# Patient Record
Sex: FEMALE | Race: WHITE | Employment: FULL TIME | URBAN - METROPOLITAN AREA
[De-identification: names, ages, dates, MRNs, and addresses within clinical notes are randomized per-mention and may not be internally consistent; named-entity substitution may affect disease eponyms.]

---

## 2019-05-14 ENCOUNTER — HOSPITAL ENCOUNTER (EMERGENCY)
Age: 29
Discharge: HOME | End: 2019-05-14
Attending: EMERGENCY MEDICINE
Payer: SELF-PAY

## 2019-05-14 VITALS
BODY MASS INDEX: 36.48 KG/M2 | HEIGHT: 71 IN | HEART RATE: 91 BPM | DIASTOLIC BLOOD PRESSURE: 87 MMHG | WEIGHT: 260.58 LBS | RESPIRATION RATE: 12 BRPM | TEMPERATURE: 98.6 F | SYSTOLIC BLOOD PRESSURE: 137 MMHG | OXYGEN SATURATION: 97 %

## 2019-05-14 DIAGNOSIS — H60.501 ACUTE OTITIS EXTERNA OF RIGHT EAR, UNSPECIFIED TYPE: Primary | ICD-10-CM

## 2019-05-14 PROCEDURE — 99283 EMERGENCY DEPT VISIT LOW MDM: CPT

## 2019-05-14 RX ORDER — BISMUTH SUBSALICYLATE 262 MG
1 TABLET,CHEWABLE ORAL DAILY
COMMUNITY

## 2019-05-14 RX ORDER — NEOMYCIN SULFATE, POLYMYXIN B SULFATE, HYDROCORTISONE 3.5; 10000; 1 MG/ML; [USP'U]/ML; MG/ML
3-4 SOLUTION/ DROPS AURICULAR (OTIC) 4 TIMES DAILY
Qty: 1 BOTTLE | Refills: 0 | Status: SHIPPED | OUTPATIENT
Start: 2019-05-14 | End: 2019-05-17

## 2019-05-14 NOTE — ED TRIAGE NOTES
Pt arrives ambulatory to ed with complaints of right ear pain with green drainage since yesterday. Drainage started this am. Pt states she took ASA around 1300.

## 2019-05-15 NOTE — ED PROVIDER NOTES
23-year-old female with no significant past medical history presents with complaints of right ear pain starting yesterday and drainage starting this morning. Patient reports green drainage. Denies trauma. Denies fever, chills, nausea, vomiting. On questioning reports soaking in bathtub and submerging head, no recent swimming. Mildly diminished hearing on the right side. No other complaints. Regular tobacco use Occasional alcohol use Denies drug use No primary care physician History reviewed. No pertinent past medical history. Past Surgical History:  
Procedure Laterality Date  HX ORTHOPAEDIC History reviewed. No pertinent family history. Social History Socioeconomic History  Marital status: Not on file Spouse name: Not on file  Number of children: Not on file  Years of education: Not on file  Highest education level: Not on file Occupational History  Not on file Social Needs  Financial resource strain: Not on file  Food insecurity:  
  Worry: Not on file Inability: Not on file  Transportation needs:  
  Medical: Not on file Non-medical: Not on file Tobacco Use  Smoking status: Current Every Day Smoker Packs/day: 0.50  Smokeless tobacco: Never Used Substance and Sexual Activity  Alcohol use: Yes Comment: rarely  Drug use: Never  Sexual activity: Not on file Lifestyle  Physical activity:  
  Days per week: Not on file Minutes per session: Not on file  Stress: Not on file Relationships  Social connections:  
  Talks on phone: Not on file Gets together: Not on file Attends Shinto service: Not on file Active member of club or organization: Not on file Attends meetings of clubs or organizations: Not on file Relationship status: Not on file  Intimate partner violence:  
  Fear of current or ex partner: Not on file Emotionally abused: Not on file Physically abused: Not on file Forced sexual activity: Not on file Other Topics Concern  Not on file Social History Narrative  Not on file ALLERGIES: Patient has no known allergies. Review of Systems Constitutional: Negative for chills and fever. HENT: Positive for ear discharge and ear pain. Negative for congestion and facial swelling. Eyes: Negative for pain and redness. Respiratory: Negative for cough and shortness of breath. Cardiovascular: Negative for chest pain. Gastrointestinal: Negative for abdominal pain and vomiting. Genitourinary: Negative for decreased urine volume and dysuria. Vitals:  
 05/14/19 1959 BP: 165/78 Pulse: 91  
Resp: 12 Temp: 98.6 °F (37 °C) SpO2: 97% Weight: 118.2 kg (260 lb 9.3 oz) Height: 5' 11\" (1.803 m) Physical Exam  
Constitutional: She is oriented to person, place, and time. She appears well-developed and well-nourished. HENT:  
Head: Normocephalic and atraumatic. Right Ear: There is drainage, swelling and tenderness. No foreign bodies. No mastoid tenderness. No middle ear effusion. No hemotympanum. Decreased hearing is noted. Left Ear: Tympanic membrane normal. No drainage, swelling or tenderness. No foreign bodies. No mastoid tenderness. No middle ear effusion. No hemotympanum. No decreased hearing is noted. Eyes: Pupils are equal, round, and reactive to light. EOM are normal.  
Neck: Normal range of motion. Neck supple. No tracheal deviation present. Pulmonary/Chest: Effort normal and breath sounds normal.  
Musculoskeletal: Normal range of motion. She exhibits no edema or tenderness. Neurological: She is alert and oriented to person, place, and time. No cranial nerve deficit. Skin: Skin is warm and dry. No rash noted. No pallor. Psychiatric: She has a normal mood and affect. Nursing note and vitals reviewed. MDM Number of Diagnoses or Management Options Acute otitis externa of right ear, unspecified type:  
Diagnosis management comments: Presenting year-old female with no past nuchal history presents with right ear pain x2 days and drainage x1 day. Patient is well-appearing, in no acute distress, hemodynamically stable, afebrile, nontoxic, right external ear canal with swelling and drainage noted, tragus  tenderness to palpation. Left TM clear, external canal clear, no tenderness to palpation. Consistent with otitis externa. Procedures

## 2019-05-15 NOTE — DISCHARGE INSTRUCTIONS
Patient Education        Swimmer's Ear: Care Instructions  Your Care Instructions    Swimmer's ear (otitis externa) is inflammation or infection of the ear canal. This is the passage that leads from the outer ear to the eardrum. Any water, sand, or other debris that gets into the ear canal and stays there can cause swimmer's ear. Putting cotton swabs or other items in the ear to clean it can also cause this problem. Swimmer's ear can be very painful. But you can treat the pain and infection with medicines. You should feel better in a few days. Follow-up care is a key part of your treatment and safety. Be sure to make and go to all appointments, and call your doctor if you are having problems. It's also a good idea to know your test results and keep a list of the medicines you take. How can you care for yourself at home? Cleaning and care  · Use antibiotic drops as your doctor directs. · Do not insert ear drops (other than the antibiotic ear drops) or anything else into the ear unless your doctor has told you to. · Avoid getting water in the ear until the problem clears up. Use cotton lightly coated with petroleum jelly as an earplug. Do not use plastic earplugs. · Use a hair dryer set on low to carefully dry the ear after you shower. · To ease ear pain, hold a warm washcloth against your ear. · Take pain medicines exactly as directed. ? If the doctor gave you a prescription medicine for pain, take it as prescribed. ? If you are not taking a prescription pain medicine, ask your doctor if you can take an over-the-counter medicine. Inserting ear drops  · Warm the drops to body temperature by rolling the container in your hands. Or you can place it in a cup of warm water for a few minutes. · Lie down, with your ear facing up. · Place drops inside the ear. Follow your doctor's instructions (or the directions on the label) for how many drops to use.  Gently wiggle the outer ear or pull the ear up and back to help the drops get into the ear. · It's important to keep the liquid in the ear canal for 3 to 5 minutes. When should you call for help? Call your doctor now or seek immediate medical care if:    · You have a new or higher fever.     · You have new or worse pain, swelling, warmth, or redness around or behind your ear.     · You have new or increasing pus or blood draining from your ear.    Watch closely for changes in your health, and be sure to contact your doctor if:    · You are not getting better after 2 days (48 hours). Where can you learn more? Go to http://vivian-vicky.info/. Enter C706 in the search box to learn more about \"Swimmer's Ear: Care Instructions. \"  Current as of: March 27, 2018  Content Version: 11.9  © 6318-4282 [employer]. Care instructions adapted under license by [employer] (which disclaims liability or warranty for this information). If you have questions about a medical condition or this instruction, always ask your healthcare professional. [address] any warranty or liability for your use of this information.

## 2019-05-17 ENCOUNTER — HOSPITAL ENCOUNTER (EMERGENCY)
Age: 29
Discharge: HOME | End: 2019-05-17
Attending: STUDENT IN AN ORGANIZED HEALTH CARE EDUCATION/TRAINING PROGRAM
Payer: SELF-PAY

## 2019-05-17 VITALS
HEART RATE: 67 BPM | DIASTOLIC BLOOD PRESSURE: 78 MMHG | WEIGHT: 254.63 LBS | TEMPERATURE: 97.5 F | RESPIRATION RATE: 14 BRPM | SYSTOLIC BLOOD PRESSURE: 152 MMHG | BODY MASS INDEX: 35.51 KG/M2 | OXYGEN SATURATION: 100 %

## 2019-05-17 DIAGNOSIS — H66.93 ACUTE EAR INFECTION, BILATERAL: Primary | ICD-10-CM

## 2019-05-17 PROCEDURE — 99282 EMERGENCY DEPT VISIT SF MDM: CPT

## 2019-05-17 RX ORDER — AMOXICILLIN AND CLAVULANATE POTASSIUM 875; 125 MG/1; MG/1
1 TABLET, FILM COATED ORAL 2 TIMES DAILY
Qty: 20 TAB | Refills: 0 | Status: SHIPPED | OUTPATIENT
Start: 2019-05-17 | End: 2019-05-27

## 2019-05-17 NOTE — ED TRIAGE NOTES
Triage note: Pt reports she was seen here a few days ago and dx with otitis externa of the right ear. Was placed on otic drops. States now she feels like the left ear is infected. Reports drainage from both ears.

## 2019-05-17 NOTE — ED NOTES
I have reviewed discharge instructions with the patient. The patient verbalized understanding. No change in pt. Initial assessment.

## 2019-05-17 NOTE — ED PROVIDER NOTES
32 yo F with no sig PMH presents with worsening ear pain. Was seen here 3 days ago for swimmer's ear, taking ear drops as prescribed, but gradually worsening pain and drainage from R ear, and now pain and drainage from L ear as well. Decreased hearing in both ears. Chewing make pain worse. Nothing makes pain better. No fever, headache, neck pain, or rash. The history is provided by the patient. Ear Pain This is a new problem. The current episode started more than 2 days ago. The problem occurs constantly. The problem has been gradually worsening. Patient complains that both ears are affected. There has been no fever. The pain is severe. Associated symptoms include ear discharge, hearing loss and cough. Pertinent negatives include no headaches, no rhinorrhea, no sore throat, no abdominal pain, no diarrhea, no vomiting, no neck pain and no rash. The risk factors include smoking/tobacco exposure. Her past medical history does not include chronic ear infection or tympanostomy tube. History reviewed. No pertinent past medical history. Past Surgical History:  
Procedure Laterality Date  HX ORTHOPAEDIC History reviewed. No pertinent family history. Social History Socioeconomic History  Marital status:  Spouse name: Not on file  Number of children: Not on file  Years of education: Not on file  Highest education level: Not on file Occupational History  Not on file Social Needs  Financial resource strain: Not on file  Food insecurity:  
  Worry: Not on file Inability: Not on file  Transportation needs:  
  Medical: Not on file Non-medical: Not on file Tobacco Use  Smoking status: Current Every Day Smoker Packs/day: 0.50  Smokeless tobacco: Never Used Substance and Sexual Activity  Alcohol use: Yes Comment: rarely  Drug use: Never  Sexual activity: Not on file Lifestyle  Physical activity: Days per week: Not on file Minutes per session: Not on file  Stress: Not on file Relationships  Social connections:  
  Talks on phone: Not on file Gets together: Not on file Attends Sikh service: Not on file Active member of club or organization: Not on file Attends meetings of clubs or organizations: Not on file Relationship status: Not on file  Intimate partner violence:  
  Fear of current or ex partner: Not on file Emotionally abused: Not on file Physically abused: Not on file Forced sexual activity: Not on file Other Topics Concern  Not on file Social History Narrative  Not on file ALLERGIES: Patient has no known allergies. Review of Systems Constitutional: Negative for chills and fever. HENT: Positive for congestion, ear discharge, ear pain and hearing loss. Negative for facial swelling, rhinorrhea, sinus pain, sore throat and trouble swallowing. Eyes: Negative for pain and discharge. Respiratory: Positive for cough. Gastrointestinal: Negative for abdominal pain, diarrhea and vomiting. Musculoskeletal: Negative for neck pain and neck stiffness. Skin: Negative for rash. Neurological: Negative for headaches. All other systems reviewed and are negative. Vitals:  
 05/17/19 0820 05/17/19 4001 BP:  152/78 Pulse:  67 Resp:  14 Temp:  97.5 °F (36.4 °C) SpO2:  100% Weight: 115.5 kg (254 lb 10.1 oz) Physical Exam  
Constitutional: She is oriented to person, place, and time. She appears well-developed. No distress. HENT:  
Head: Normocephalic and atraumatic. Right Ear: External ear normal. There is drainage. No mastoid tenderness. Decreased hearing is noted. Left Ear: External ear normal. There is drainage. No mastoid tenderness. Tympanic membrane is perforated. Decreased hearing is noted. Cannot visualize TM on R 2/2 drainage.   
Eyes: Conjunctivae and EOM are normal.  
 Neck: Normal range of motion. Neck supple. Cardiovascular: Normal rate, regular rhythm and normal heart sounds. Pulmonary/Chest: Effort normal and breath sounds normal. No respiratory distress. Abdominal: Soft. There is no tenderness. There is no guarding. Musculoskeletal: Normal range of motion. She exhibits no edema. Neurological: She is alert and oriented to person, place, and time. She exhibits normal muscle tone. Skin: Skin is warm and dry. MDM Procedures A/P: 34 yo F with recent otitis externa, now with increased pain and drainage from both ears, now likely AOM with TM rupture on L. DDx also includes cholesteatoma. No mastoid TTP or overlying erythema or induration or swelling c/w acute mastoiditis. She is nontoxic and appears well. Will switch to PO Augmentin and have pt follow up with ENT. RTER precautions provided. Pt agrees with and understand the plan. All questions answered.

## 2019-05-17 NOTE — DISCHARGE INSTRUCTIONS

## 2019-05-17 NOTE — LETTER
[address] 
[facility] 
[address] [facility] [address] [id number] 
[phone] Work/School Note Date: 5/17/2019 To Whom It May concern: 
 
Theresa Rojas was seen and treated today in the emergency room by the following provider(s): 
Attending Provider: Zarina Connolly MD.   
 
Theresa Rojas may return to work on 5/19/18 or sooner if symptoms improve. Sincerely, Jason Caputo RN

## 2019-08-24 ENCOUNTER — HOSPITAL ENCOUNTER (EMERGENCY)
Age: 29
Discharge: HOME | End: 2019-08-24
Attending: EMERGENCY MEDICINE
Payer: SELF-PAY

## 2019-08-24 ENCOUNTER — APPOINTMENT (OUTPATIENT)
Dept: GENERAL RADIOLOGY | Age: 29
End: 2019-08-24
Attending: EMERGENCY MEDICINE
Payer: SELF-PAY

## 2019-08-24 VITALS
RESPIRATION RATE: 16 BRPM | TEMPERATURE: 98.3 F | BODY MASS INDEX: 36.77 KG/M2 | SYSTOLIC BLOOD PRESSURE: 127 MMHG | WEIGHT: 263.67 LBS | OXYGEN SATURATION: 98 % | HEART RATE: 67 BPM | DIASTOLIC BLOOD PRESSURE: 72 MMHG

## 2019-08-24 DIAGNOSIS — M79.89 SWELLING OF RIGHT FOOT: Primary | ICD-10-CM

## 2019-08-24 PROCEDURE — 99283 EMERGENCY DEPT VISIT LOW MDM: CPT

## 2019-08-24 PROCEDURE — 73630 X-RAY EXAM OF FOOT: CPT

## 2019-08-25 NOTE — ED PROVIDER NOTES
Healthy; presents with a 2-month history of right foot pain and swelling. She states it started in her right, lateral, dorsal forefoot near the base of her fourth and fifth toes. Since then it has spread medially and now involves her second through fifth metatarsal region. She denies injury. There is no redness or warmth. She has never noted a pustule or any drainage. Pain is moderate making it difficult for her to wear shoes. She states that she was seen at the [facility] ED and had an x-ray. She left before getting the results. She denies any other complaints. She has tried ibuprofen and Epsom salt baths without relief. History reviewed. No pertinent past medical history. Past Surgical History:   Procedure Laterality Date    HX ORTHOPAEDIC      surgery to left foot in 2004    HX TUBAL LIGATION           History reviewed. No pertinent family history.     Social History     Socioeconomic History    Marital status:      Spouse name: Not on file    Number of children: Not on file    Years of education: Not on file    Highest education level: Not on file   Occupational History    Not on file   Social Needs    Financial resource strain: Not on file    Food insecurity:     Worry: Not on file     Inability: Not on file    Transportation needs:     Medical: Not on file     Non-medical: Not on file   Tobacco Use    Smoking status: Current Every Day Smoker     Packs/day: 0.50    Smokeless tobacco: Never Used   Substance and Sexual Activity    Alcohol use: Yes     Comment: rarely    Drug use: Never    Sexual activity: Not on file   Lifestyle    Physical activity:     Days per week: Not on file     Minutes per session: Not on file    Stress: Not on file   Relationships    Social connections:     Talks on phone: Not on file     Gets together: Not on file     Attends Christianity service: Not on file     Active member of club or organization: Not on file     Attends meetings of clubs or organizations: Not on file     Relationship status: Not on file    Intimate partner violence:     Fear of current or ex partner: Not on file     Emotionally abused: Not on file     Physically abused: Not on file     Forced sexual activity: Not on file   Other Topics Concern    Not on file   Social History Narrative    Not on file         ALLERGIES: Patient has no known allergies. Review of Systems   All other systems reviewed and are negative. Vitals:    08/24/19 2105   BP: 151/76   Pulse: 75   Resp: 16   Temp: 98.6 °F (37 °C)   SpO2: 96%   Weight: 119.6 kg (263 lb 10.7 oz)            Physical Exam   Constitutional: She appears well-developed and well-nourished. HENT:   Head: Normocephalic and atraumatic. Eyes: Conjunctivae are normal.   Neck: No tracheal deviation present. Cardiovascular: Normal rate. Pulmonary/Chest: Effort normal.   Abdominal: She exhibits no distension. Musculoskeletal:   Right dorsal foot swelling and tenderness in the second through fifth metatarsal regions. There is no redness, warmth, or pustule. No fluctuance. Neurological: She is alert. Skin: Skin is dry. Psychiatric: She has a normal mood and affect. Nursing note and vitals reviewed. MDM       Procedures    Progress Note:  Results, treatment, and follow up plan have been discussed with patient/family. Questions were answered. Soy Boland MD  9:48 PM    A/P: Right foot swelling for 2 months -unclear etiology. Reassuring appearance and exam; VSS; x-ray normal except for soft tissue swelling. Ortho follow-up.   Soy Boland MD  9:49 PM

## 2019-08-25 NOTE — ED TRIAGE NOTES
R foot pain x2 months. Pt reports \"I think that its a cyst and it has been progressively hurting worse and then started getting bruised about 2 weeks ago. \" Pt reports taking ibuprofen yesterday but denies taking anything for pain today. Pt reports \" I was seen today at [facility] and they xrayed it but they took forever to do anything or give me the results so I left. \"

## 2019-08-25 NOTE — ED NOTES
Pt discharged in stable condition at this time. MD reviewed discharge instructions and follow up with patient at bedside. Pt verbalized understanding and denies any needs or questions.